# Patient Record
Sex: MALE | Race: AMERICAN INDIAN OR ALASKA NATIVE | NOT HISPANIC OR LATINO | URBAN - METROPOLITAN AREA
[De-identification: names, ages, dates, MRNs, and addresses within clinical notes are randomized per-mention and may not be internally consistent; named-entity substitution may affect disease eponyms.]

---

## 2022-08-17 ENCOUNTER — OUTPATIENT (OUTPATIENT)
Dept: OUTPATIENT SERVICES | Facility: HOSPITAL | Age: 37
LOS: 1 days | End: 2022-08-17
Payer: MEDICAID

## 2022-08-17 VITALS
RESPIRATION RATE: 18 BRPM | HEART RATE: 73 BPM | OXYGEN SATURATION: 100 % | DIASTOLIC BLOOD PRESSURE: 78 MMHG | SYSTOLIC BLOOD PRESSURE: 118 MMHG | TEMPERATURE: 98 F | WEIGHT: 108.47 LBS | HEIGHT: 65 IN

## 2022-08-17 VITALS
HEART RATE: 82 BPM | OXYGEN SATURATION: 97 % | DIASTOLIC BLOOD PRESSURE: 72 MMHG | RESPIRATION RATE: 16 BRPM | SYSTOLIC BLOOD PRESSURE: 103 MMHG

## 2022-08-17 DIAGNOSIS — H50.111 MONOCULAR EXOTROPIA, RIGHT EYE: ICD-10-CM

## 2022-08-17 DIAGNOSIS — Z98.890 OTHER SPECIFIED POSTPROCEDURAL STATES: Chronic | ICD-10-CM

## 2022-08-17 LAB
HCT VFR BLD CALC: 44 % — SIGNIFICANT CHANGE UP (ref 39–50)
HGB BLD-MCNC: 15.2 G/DL — SIGNIFICANT CHANGE UP (ref 13–17)
MCHC RBC-ENTMCNC: 30.7 PG — SIGNIFICANT CHANGE UP (ref 27–34)
MCHC RBC-ENTMCNC: 34.5 GM/DL — SIGNIFICANT CHANGE UP (ref 32–36)
MCV RBC AUTO: 88.9 FL — SIGNIFICANT CHANGE UP (ref 80–100)
NRBC # BLD: 0 /100 WBCS — SIGNIFICANT CHANGE UP (ref 0–0)
PLATELET # BLD AUTO: 72 K/UL — LOW (ref 150–400)
RBC # BLD: 4.95 M/UL — SIGNIFICANT CHANGE UP (ref 4.2–5.8)
RBC # FLD: 13.9 % — SIGNIFICANT CHANGE UP (ref 10.3–14.5)
WBC # BLD: 3.9 K/UL — SIGNIFICANT CHANGE UP (ref 3.8–10.5)
WBC # FLD AUTO: 3.9 K/UL — SIGNIFICANT CHANGE UP (ref 3.8–10.5)

## 2022-08-17 PROCEDURE — 36415 COLL VENOUS BLD VENIPUNCTURE: CPT

## 2022-08-17 PROCEDURE — ZZZZZ: CPT

## 2022-08-17 PROCEDURE — 67312 REVISE TWO EYE MUSCLES: CPT | Mod: RT

## 2022-08-17 PROCEDURE — 85027 COMPLETE CBC AUTOMATED: CPT

## 2022-08-17 NOTE — ASU DISCHARGE PLAN (ADULT/PEDIATRIC) - ASU DC SPECIAL INSTRUCTIONSFT
Maxitrol ointment in the right eye twice daily for 2days then Maxitrol drops in the right eye four times daily for 2 weeks  Follow up with Dr. Gonzalez in 2 weeks.

## 2022-08-17 NOTE — ASU PATIENT PROFILE, ADULT - FALL HARM RISK - HARM RISK INTERVENTIONS
Communicate Risk of Fall with Harm to all staff/Reinforce activity limits and safety measures with patient and family/Review medications for side effects contributing to fall risk/Tailored Fall Risk Interventions/Toileting schedule using arm’s reach rule for commode and bathroom/Visual Cue: Yellow wristband and red socks/Bed in lowest position, wheels locked, appropriate side rails in place/Call bell, personal items and telephone in reach/Instruct patient to call for assistance before getting out of bed or chair/Non-slip footwear when patient is out of bed/[place] to call system/Physically safe environment - no spills, clutter or unnecessary equipment/Purposeful Proactive Rounding/Room/bathroom lighting operational, light cord in reach

## 2022-08-17 NOTE — ASU PATIENT PROFILE, ADULT - NSICDXPASTMEDICALHX_GEN_ALL_CORE_FT
PAST MEDICAL HISTORY:  Cirrhosis     Esophageal varices     Hepatitis B     Splenomegaly     Thrombocytopenia

## 2022-08-17 NOTE — ASU DISCHARGE PLAN (ADULT/PEDIATRIC) - CARE PROVIDER_API CALL
Nelson Gonzalez)  Ophthalmology  [facility], [address]  [place] [address]  Phone: [phone]  Fax: [phone]  Follow Up Time:

## 2022-08-17 NOTE — ASU DISCHARGE PLAN (ADULT/PEDIATRIC) - NS MD DC FALL RISK RISK
For information on Fall & Injury Prevention, visit: [email or web address] OR  [email or web address] OR  https://www.cdc.gov/steadi/patient.html

## 2022-08-17 NOTE — ASU DISCHARGE PLAN (ADULT/PEDIATRIC) - NS DC TRANSLATOR ACCEPT
Physical Therapy Evaluation    Referred by: Tano Corral MD; Medical Diagnosis (from order):    Diagnosis Information      Diagnosis    E888.9 (ICD-9-CM) - W19.XXXA (ICD-10-CM) - Fall, initial encounter    924.20 (ICD-9-CM) - S90.31XA (ICD-10-CM) - Contusion of right foot, initial encounter              Visit: 1      Initial Evaluation  Treatment Diagnosis: impaired mobility, impaired balance, increased risk for falls, impaired safety awareness, impaired cognition and impaired muscle strength  Date of onset: 6/20/2022  Diagnosis Precautions: Fall, Right foot pain, decreased cognition and safety awareness  Chart reviewed at time of initial evaluation (relevant co-morbidities, allergies, tests and medications listed): Chart review completed.    Diagnostic Tests: X-ray: reviewed.      SUBJECTIVE                                                                                                                 Foot pain started yesterday per daughter, pt fell this morning around 445 and had problems ambulating since.  Daughter is staying with him because she is visiting from [place], but otherwise he lives alone.  Pt does not remember exactly what happened.    Pain / Symptoms:  Pain/symptom is: intermittent  Location: Right dorsal part of foot, unable to rate, he has pain behavior with palpation and mobility.    Quality / Description: ache, sharp.  Alleviating Factors: avoiding movement in involved area, ice, prescription medications.   Progression since onset: improved  Patient/Family/Caregiver Goals: improved balance, increased strength, independence with walking/mobility/transfers, prevent falls/reduce fall risk, improve home safety/access and decreased pain.   Prior treatment: no therapies  Discharged from hospital, home health, or skilled nursing facility in last 30 days: no    Home Environment:   Patient lives with alone  Type of home: single level home  Assistance available: intermittent  Feels safe at  home/work/school.  2 or more falls or an unexplained fall with injury in the last year:  No    Function (patient/family/caregiver reported):   Prior Level: Pt normally functions at a modified independent level in his home setting using a cane.  He receives help for IADLS and maintaining his home and yard.  Also has 2x/week caregiver wed/thurs for a few hours in the p.m. for cleaning.        OBJECTIVE                                                                                                                    Cognitive Status   Orientation    - Oriented to: person and place   - Disoriented to: time and situation  Attention    - Attention: attends with cues to redirect   - Attention impairment: distractibility and impulsivity  Memory   - impaired and decreased recall of recent events  Safety Awareness/Insight   - impaired and decreased awareness of need for assistance  Verbal Expression/Loudness    - intact     Strength  (out of 5 unless noted, standard test position unless noted, lbs tested with hand held dynamometer)   Comments / Details: Generalized weakness noted with mobility    Bed Mobility:      Supine to sit: supervision    Sit to supine: supervision  Cues for safety, increased time due to pain.    Transfers:    Assistive devices: 1 person, 2-wheeled walker and gait belt    Sit to stand: minimal assist, with tactile cues and with verbal cues    Stand to sit: minimal assist, with tactile cues and with verbal cues    Stand pivot: minimal assist, with tactile cues and with verbal cues  Cueing for technique, hand placements, also walker proximity and keeping walker close with pivots.  Assist for safety at al times    Gait     - Assistive device: gait belt, two-wheeled walker and single point cane     - Distance (feet unless otherwise indicated): 10, 125, 125, 125     - Assist level: minimal assist and moderate assist     - Surface: even  Gait Analysis:     - Description: decreased tasneem/pace  First gait  effort 10' used cane, assist for safety, balance, very unsteady, moderate assist, difficulty with any weight bearing on RLE.      Remaining gait efforts with walker, improved with practice but unsteady at times, cues for sequencing and walker proximity, assist for balance and safety.  Short step length with reduced weight bearing to RLE.  He had some difficulty with object negotiation as well.      Stair Ambulation:   Ascend:     Assist: gait belt used and minimal assist    Pattern: step-to    Railing: left    Description and Details: Cues for sequence     TREATMENT                                                                                                                  Therapeutic Activity:  Mobility training with device, walker safety, protecting RLE, gait and stair training with patient's daughter Ludmila, education on dc planning, dc options, home therapy vs anna, current home safety deficits such as cognition and remembering to use device.      Skilled input: verbal instruction/cues    Writer verbally educated and received verbal consent for hand placement, positioning of patient, and techniques to be performed today from patient            ASSESSMENT                                                                                                           89 year old male patient has signs and symptoms consistent with impaired mobility, impaired balance, increased risk for falls, impaired safety awareness, impaired cognition and impaired muscle strength based on evaluation above that has reported functional limitations listed above is below functional baseline.    Pt seen in ED for evaluation of mobility and home safety following new onset foot contusion and fall this morning.  Pt lives alone, worsening cognition and safety awareness, daughter in from [place] staying with him, has 2 other daughters but they are 1+ hour away.  Eval session focused on assessment and progression of mobility in ED.  Baseline pt  uses cane, he is unable to safely use this due to poor balance and foot pain limiting weight bearing.  His mobility is improved with walker, but still has some significant safety concerns using walker.  Poor walker proximity, inconsistent use, leaves it behind to sit down, unsteady at times and needs min assist at all times.  Also attempted stairs and pt needs min assist at all times.  Talked to patient and daughter at length about discharge planning.  Pt would need 24 hour care to safely return home at his level and this would be difficult for daughter to provide.  She is going to call her sisters but it seems likely that patient will not be safe to return home and not have the assist available.  He may need to be admitted for rehab placement. If daughter decides to take him home, they would need to use walker 100% of time and have min assist with all mobility, home PT/OT     Prognosis: patient will benefit from skilled therapy  good  Predicted patient presentation: Low (stable) - Patient comorbidities and complexities, as defined above, will have little effect on progress for prescribed plan of care.  Patient/Caregiver Education:   Who will be receiving education: patient and patient's family  Are they ready to learn: yes  Preferred learning style: written and demonstration  Barriers to learning: cognitive  Results of above outlined education: Verbalizes understanding and Needs reinforcement    PLAN                                                                                                                         The following skilled interventions to be implemented to achieve goals listed below:  Gait Training (86796)  Neuromuscular Re-Education (69783)  Therapeutic Activity (47151)  Therapeutic Exercise (53825)    Frequency / Duration: 1 times per week tapering as patient progresses for an estimated total of 1 visits for 1 weeks    Patient and patient's family involved in and agreed to plan of care and  goals.  Suggestions for next session as indicated: Progress per plan of care, 1x visit, pt will pursue hospital admission with TRIP placement vs 24 hour assist and home therapy        GOALS                                                                                                                           Long Term Goals: to be met by end of plan of care  1. Modified independent with bed mobility.  2. Modified independent with transfers using ww.    3. Modified independent with gait 150 ft using ww.      4. Modified independent with 3 stairs using LRAD/rail.           Therapy procedure time and total treatment time can be found documented on the Time Entry flowsheet.   yes

## (undated) DEVICE — GLV 7.5 PROTEXIS

## (undated) DEVICE — BLANKET WARMER LOWER ADULT

## (undated) DEVICE — SUT SOFSILK 4-0 30" CV-23

## (undated) DEVICE — SOL IRR POUR H2O 250ML

## (undated) DEVICE — SUT VICRYL 8-0 5" BV130-5

## (undated) DEVICE — SYR LUER LOK 3CC

## (undated) DEVICE — SYE-CONSTELLATION MACHINE 1003466901X: Type: DURABLE MEDICAL EQUIPMENT

## (undated) DEVICE — SOL BALANCE SALT 15ML

## (undated) DEVICE — SUT VICRYL 6-0 12" S-29 DA

## (undated) DEVICE — PACK OPTH STRAB CUSTOM

## (undated) DEVICE — WRAP COMPRESSION CALF MED

## (undated) DEVICE — SUT VICRYL 6-0 18" S-28 DA